# Patient Record
Sex: FEMALE | Race: WHITE
[De-identification: names, ages, dates, MRNs, and addresses within clinical notes are randomized per-mention and may not be internally consistent; named-entity substitution may affect disease eponyms.]

---

## 2018-02-24 ENCOUNTER — HOSPITAL ENCOUNTER (EMERGENCY)
Dept: HOSPITAL 25 - ED | Age: 33
Discharge: HOME | End: 2018-02-24
Payer: COMMERCIAL

## 2018-02-24 VITALS — DIASTOLIC BLOOD PRESSURE: 87 MMHG | SYSTOLIC BLOOD PRESSURE: 119 MMHG
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
## 2018-02-24 DIAGNOSIS — S69.91XA: Primary | ICD-10-CM

## 2018-02-24 PROCEDURE — 99282 EMERGENCY DEPT VISIT SF MDM: CPT

## 2018-02-24 NOTE — ED
Upper Extremity Pain





- HPI Summary


HPI Summary: 


33-year-old female presents with right thumb pain today.  She states she did 

have a high 5 maneuver with hand and felt the pain in the palmar aspect of her 

right thumb.  But is really unsure of how she moved her thumb. She denies any 

previous injury to the area.  She denies any numbness or tingling.  She has 

swelling to the palmar aspect of her right thumb.  She states she has pain 

greatest when she tries to oppose her thumb to her pinky.  She denies any 

weakness.  She states the joint may feel like it dislocated.   She is left-

handed.  She states she was bowling when this occurred.  She was bowling with 

her left hand.





- History of Current Complaint


Chief Complaint: EDExtremityUpper


Stated Complaint: RT HAND INJRUY


Time Seen by Provider: 02/24/18 21:35





- Allergies/Home Medications


Allergies/Adverse Reactions: 


 Allergies











Allergy/AdvReac Type Severity Reaction Status Date / Time


 


No Known Allergies Allergy   Verified 02/24/18 21:22














PMH/Surg Hx/FS Hx/Imm Hx


Endocrine/Hematology History: 


   Denies: Hx Anticoagulant Therapy


Cardiovascular History: 


   Denies: Hx Myocardial Infarction


Infectious Disease History: No


Infectious Disease History: 


   Denies: Traveled Outside the [place] in Last 30 Days





- Family History


Known Family History: Positive: Hypertension





- Social History


Alcohol Use: Daily


Alcohol Amount: 1 glass or 2


Substance Use Type: Reports: None


Smoking Status (MU): Never Smoked Tobacco





Review of Systems


Negative: Fever


Negative: Chest Pain


Negative: Shortness Of Breath


Positive: Myalgia - right thumb pain


All Other Systems Reviewed And Are Negative: Yes





Physical Exam


Triage Information Reviewed: Yes


Vital Signs On Initial Exam: 


 Initial Vitals











Temp Pulse Resp BP Pulse Ox


 


 98.4 F   88   20   156/87   96 


 


 02/24/18 21:18  02/24/18 21:18  02/24/18 21:18  02/24/18 21:18  02/24/18 21:18











Vital Signs Reviewed: Yes


Appearance: Positive: Well-Appearing


Skin: Positive: Warm, Dry


Head/Face: Positive: Normal Head/Face Inspection


Eyes: Positive: Normal, Conjunctiva Clear


Respiratory/Lung Sounds: Positive: Clear to Auscultation, Breath Sounds Present


Cardiovascular: Positive: Normal, RRR


Musculoskeletal: Positive: Limited @ - right thumb when tries to oppose finger 

to pinky has pain, Edema Right - thenar eminence, Other - good pulses, 

capillary refill<2 secs, tenderness over thenar eminence, sensation grossly 

intact, no laxity with valgus or varus stress


Neurological: Positive: Normal


Psychiatric: Positive: Normal





Diagnostics





- Vital Signs


 Vital Signs











  Temp Pulse Resp BP Pulse Ox


 


 02/24/18 21:18  98.4 F  88  20  156/87  96














- Laboratory


Lab Statement: Any lab studies that have been ordered have been reviewed, and 

results considered in the medical decision making process.





- Radiology


  ** hand


Xray Interpretation: No Acute Changes


Radiology Interpretation Completed By: Radiologist





Course/Dx





- Course


Course Of Treatment: 33-year-old female presents with right thumb pain today.  

She states she did have a high 5 maneuver with hand and felt the pain in the 

palmar aspect of her right thumb.  But is really unsure of how she moved her 

thumb. She denies any previous injury to the area.  She denies any numbness or 

tingling.  She has swelling to the palmar aspect of her right thumb.  She 

states she has pain greatest when she tries to oppose her thumb to her pinky.  

She denies any weakness.  She states the joint may feel like it dislocated.   

She is left-handed.  She states she was bowling when this occurred.  She was 

bowling with her left hand.  on exam has swelling and pain of thenar eminence. 

no laxity with valgus or varus stress. has ROM of thumb with pain. xray read by 

me as normal. could be sprain vs tear. will place in thumb spica and have 

follow up with ortho. patient understand and agrees with plan.





- Diagnoses


Differential Diagnosis/HQI/PQRI: Positive: Fracture (Closed), Strain, Sprain


Provider Diagnoses: 


 Thumb injury








Discharge





- Discharge Plan


Condition: Good


Disposition: HOME


Patient Education Materials:  R.I.C.E. Treatment (ED)


Referrals: 


Lalita Mojica MD [Primary Care Provider] - 


Becca Madrid MD [Medical Doctor] - 


Additional Instructions: 


Take Tylenol or ibuprofen every 6 hours as needed for pain


Apply ice, rest, elevate   


Keep splint on area throughout day as tolerated


Follow up with ortho


Return to ED if develop any new or worsening symptoms

## 2018-02-25 NOTE — RAD
Indication: Right hand pain, attention thumb.



4 views of the right hand demonstrates no fracture or dislocation. No other bone or joint

abnormality is identified.

Joint spaces all well-preserved.



IMPRESSION: No fracture of the right hand or thumb is noted. No evidence of degenerative

joint disease is present.

## 2019-06-24 ENCOUNTER — HOSPITAL ENCOUNTER (EMERGENCY)
Dept: HOSPITAL 25 - ED | Age: 34
Discharge: HOME | End: 2019-06-24
Payer: COMMERCIAL

## 2019-06-24 VITALS — SYSTOLIC BLOOD PRESSURE: 118 MMHG | DIASTOLIC BLOOD PRESSURE: 64 MMHG

## 2019-06-24 DIAGNOSIS — S29.011A: Primary | ICD-10-CM
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
## 2019-06-24 DIAGNOSIS — R07.81: ICD-10-CM

## 2019-06-24 PROCEDURE — 99282 EMERGENCY DEPT VISIT SF MDM: CPT

## 2019-06-24 NOTE — ED
Upper Extremity Pain





- HPI Summary


HPI Summary: 


Patient is a 34-year-old female presents emergency department for right lateral 

low rib pain x 1 day. Pt. states this morning she reached for something and 

felt a "pop" in right low rib cage. Pt. states she then had significant pain to 

rib cage. Pt. states pain has improved but is still present. Pt. denies SOB. 

Pt. states she had a fall about one ago and injured right side of ribs. She 

states she went to PCP and had a negative xray. Pt. states she was taking 

motrin and pain was improving until today. Pt. otherwise denies cough, fever, 

sob, abd. pain, urinary sxs, V/D. Sxs are mild in severity. Touching area makes 

sxs worse. Rest makes sxs better. No past medical hx. 








- History of Current Complaint


Chief Complaint: EDFlankPain


Stated Complaint: RT ABD PAIN PER PT


Time Seen by Provider: 06/24/19 10:55


Hx Obtained From: Patient





- Allergies/Home Medications


Allergies/Adverse Reactions: 


 Allergies











Allergy/AdvReac Type Severity Reaction Status Date / Time


 


No Known Allergies Allergy   Verified 06/24/19 10:35











Home Medications: 


 Home Medications





Etonogest/Eth.estradiol (Nf) [Nuvaring Vaginal Ring] 1 dose VAGINAL SEE 

INSTRUCTIONS 06/24/19 [History Confirmed 06/24/19]











PMH/Surg Hx/FS Hx/Imm Hx


Previously Healthy: Yes


Endocrine/Hematology History: 


   Denies: Hx Anticoagulant Therapy


Cardiovascular History: 


   Denies: Hx Myocardial Infarction


Infectious Disease History: No


Infectious Disease History: 


   Denies: Traveled Outside the [place] in Last 30 Days





- Family History


Known Family History: Positive: Hypertension, Non-Contributory





- Social History


Occupation: Employed Full-time


Lives: With Family


Alcohol Use: Daily


Alcohol Amount: 1 glass or 2


Substance Use Type: Reports: None


Smoking Status (MU): Never Smoked Tobacco





Review of Systems


Constitutional: Negative


Negative: Fever, Chills


Cardiovascular: Negative


Negative: Palpitations, Chest Pain


Respiratory: Negative


Negative: Shortness Of Breath, Cough


Gastrointestinal: Negative


Negative: Abdominal Pain, Vomiting, Diarrhea, Nausea


Genitourinary: Negative


Negative: dysuria, frequency, flank pain, hematuria


Positive: Other - Right lateral low rib pain.


Skin: Negative


Negative: Rash


Neurological: Negative


All Other Systems Reviewed And Are Negative: Yes





Physical Exam


Triage Information Reviewed: Yes


Vital Signs On Initial Exam: 


 Initial Vitals











Temp Pulse Resp BP Pulse Ox


 


 98.9 F   60   14   122/80   98 


 


 06/24/19 10:31  06/24/19 10:31  06/24/19 10:31  06/24/19 10:31  06/24/19 10:31











Vital Signs Reviewed: Yes


Appearance: Positive: Well-Appearing - Pt. sitting up in bed in NAD.


Skin: Positive: Warm, Dry


Head/Face: Positive: Normal Head/Face Inspection


Eyes: Positive: Normal, EOMI, VENU


Neck: Positive: Supple


Respiratory/Lung Sounds: Positive: Clear to Auscultation, Breath Sounds 

Present.  Negative: Rales, Rhonchi, Wheezes


Cardiovascular: Positive: Normal, RRR


Abdomen Description: Positive: Nontender, Soft


Musculoskeletal: Positive: Other - pain on palpation over right low 

anterolateral ribcage. No edema, ecchymosis, rash or breaks in the skin. 

Minimal pain over right CVA region.


Neurological: Positive: Normal, CN Intact II-III


Psychiatric: Positive: Affect/Mood Appropriate





Diagnostics





- Vital Signs


 Vital Signs











  Temp Pulse Resp BP Pulse Ox


 


 06/24/19 10:31  98.9 F  60  14  122/80  98














- Laboratory


Lab Statement: Any lab studies that have been ordered have been reviewed, and 

results considered in the medical decision making process.





Course/Dx





- Course


Course Of Treatment: Pt. presenting for rib pain after stretching. Pt. 

concerned given recent injury. Afebrile with stable VS. Chest and rib xrays 

negative for acute findings per radiology. Suspect muscle/cartilage strain. 

Results discussed. Advised ice and NSAIDs. Can f.u with PCP if sxs persist. To 

return to ER if sxs change or worsen.





- Diagnoses


Differential Diagnosis/HQI/PQRI: Positive: Arthritis, Contusion, Fracture (

Closed), Strain, Sprain


Provider Diagnoses: 


 Muscle strain of chest wall








Discharge





- Sign-Out/Discharge


Documenting (check all that apply): Patient Departure


Patient Received Moderate/Deep Sedation with Procedure: No





- Discharge Plan


Condition: Good


Disposition: HOME


Patient Education Materials:  Muscle Strain (ED), Musculoskeletal Pain (ED)


Referrals: 


Lalita Mojica MD [Primary Care Provider] - 


Additional Instructions: 


Follow up with PCP


Ice intermittently


Ibuprofen 600mg every 8 hours x 1 week


Activity as tolerated 


Return to ER if symptoms change or worsen





- Billing Disposition and Condition


Condition: GOOD


Disposition: Home